# Patient Record
Sex: FEMALE | Race: WHITE
[De-identification: names, ages, dates, MRNs, and addresses within clinical notes are randomized per-mention and may not be internally consistent; named-entity substitution may affect disease eponyms.]

---

## 2017-01-29 ENCOUNTER — HOSPITAL ENCOUNTER (INPATIENT)
Dept: HOSPITAL 17 - NEPC | Age: 72
LOS: 3 days | Discharge: HOME | DRG: 310 | End: 2017-02-01
Payer: COMMERCIAL

## 2017-01-29 VITALS
SYSTOLIC BLOOD PRESSURE: 188 MMHG | HEART RATE: 140 BPM | TEMPERATURE: 98.5 F | OXYGEN SATURATION: 96 % | DIASTOLIC BLOOD PRESSURE: 125 MMHG | RESPIRATION RATE: 16 BRPM

## 2017-01-29 VITALS
RESPIRATION RATE: 18 BRPM | HEART RATE: 155 BPM | SYSTOLIC BLOOD PRESSURE: 121 MMHG | OXYGEN SATURATION: 97 % | DIASTOLIC BLOOD PRESSURE: 91 MMHG

## 2017-01-29 DIAGNOSIS — E78.5: ICD-10-CM

## 2017-01-29 DIAGNOSIS — I48.91: Primary | ICD-10-CM

## 2017-01-29 DIAGNOSIS — I47.1: ICD-10-CM

## 2017-01-29 DIAGNOSIS — E78.00: ICD-10-CM

## 2017-01-29 DIAGNOSIS — I48.92: ICD-10-CM

## 2017-01-29 DIAGNOSIS — F32.9: ICD-10-CM

## 2017-01-29 LAB
APTT BLD: 29.4 SEC (ref 24.3–30.1)
BASOPHILS # BLD AUTO: 0.1 TH/MM3 (ref 0–0.2)
BASOPHILS NFR BLD: 1.1 % (ref 0–2)
EOSINOPHIL # BLD: 0.4 TH/MM3 (ref 0–0.4)
EOSINOPHIL NFR BLD: 3.5 % (ref 0–4)
ERYTHROCYTE [DISTWIDTH] IN BLOOD BY AUTOMATED COUNT: 13.6 % (ref 11.6–17.2)
HCT VFR BLD CALC: 43 % (ref 35–46)
HEMO FLAGS: (no result)
INR PPP: 1 RATIO
LYMPHOCYTES # BLD AUTO: 2.3 TH/MM3 (ref 1–4.8)
LYMPHOCYTES NFR BLD AUTO: 22.8 % (ref 9–44)
MCH RBC QN AUTO: 29.8 PG (ref 27–34)
MCHC RBC AUTO-ENTMCNC: 34.3 % (ref 32–36)
MCV RBC AUTO: 86.9 FL (ref 80–100)
MONOCYTES NFR BLD: 9.6 % (ref 0–8)
NEUTROPHILS # BLD AUTO: 6.4 TH/MM3 (ref 1.8–7.7)
NEUTROPHILS NFR BLD AUTO: 63 % (ref 16–70)
PLATELET # BLD: 268 TH/MM3 (ref 150–450)
PROTHROMBIN TIME: 10.7 SEC (ref 9.8–11.6)
RBC # BLD AUTO: 4.95 MIL/MM3 (ref 4–5.3)
WBC # BLD AUTO: 10.2 TH/MM3 (ref 4–11)

## 2017-01-29 PROCEDURE — 96361 HYDRATE IV INFUSION ADD-ON: CPT

## 2017-01-29 PROCEDURE — 71010: CPT

## 2017-01-29 PROCEDURE — 85025 COMPLETE CBC W/AUTO DIFF WBC: CPT

## 2017-01-29 PROCEDURE — 85610 PROTHROMBIN TIME: CPT

## 2017-01-29 PROCEDURE — 80048 BASIC METABOLIC PNL TOTAL CA: CPT

## 2017-01-29 PROCEDURE — 93005 ELECTROCARDIOGRAM TRACING: CPT

## 2017-01-29 PROCEDURE — 83735 ASSAY OF MAGNESIUM: CPT

## 2017-01-29 PROCEDURE — 82550 ASSAY OF CK (CPK): CPT

## 2017-01-29 PROCEDURE — 85730 THROMBOPLASTIN TIME PARTIAL: CPT

## 2017-01-29 PROCEDURE — 84484 ASSAY OF TROPONIN QUANT: CPT

## 2017-01-29 PROCEDURE — 96365 THER/PROPH/DIAG IV INF INIT: CPT

## 2017-01-29 PROCEDURE — 96376 TX/PRO/DX INJ SAME DRUG ADON: CPT

## 2017-01-29 PROCEDURE — 84443 ASSAY THYROID STIM HORMONE: CPT

## 2017-01-29 RX ADMIN — PHENYTOIN SODIUM SCH MLS/HR: 50 INJECTION INTRAMUSCULAR; INTRAVENOUS at 23:18

## 2017-01-29 NOTE — RADRPT
EXAM DATE/TIME:  01/29/2017 23:14 

 

[facility] COMPARISON:     

CHEST SINGLE AP, July 24, 2015, 1:37.

 

                     

INDICATIONS :     

Tachycardia.

                     

 

MEDICAL HISTORY :     

None.          

 

SURGICAL HISTORY :     

None.   

 

ENCOUNTER:     

Initial                                        

 

ACUITY:     

1 day      

 

PAIN SCORE:     

0/10

 

LOCATION:     

Bilateral chest 

 

FINDINGS:     

A single view of the chest demonstrates the lungs to be symmetrically aerated without evidence of mas
s, infiltrate or effusion.  The cardiomediastinal contours are unremarkable.  Osseous structures are 
intact.

 

CONCLUSION:     

No evidence of acute cardiopulmonary disease.

 

 

 

 Nitin Luis MD on January 29, 2017 at 23:31           

Board Certified Radiologist.

 This report was verified electronically.

## 2017-01-29 NOTE — PD
HPI


Chief Complaint:  Chest Pain


Time Seen by Provider:  22:54


Travel History


International Travel<30 days:  No


Contact w/Intl Traveler<30days:  No


Traveled to known affect area:  No





History of Present Illness


HPI


71-year-old female complains of palpitation.  Patient has history of atrial 

fibrillation status post ablation procedure 3.  Patient is on Eliquis and 

Tikosyn.  Patient started having palpitation since 8:30 this evening.  Patient 

states that he started having tachycardia and irregular heartbeat since then.  

Patient denies any chest pain or shortness of breath.  Patient took Eliquis 

this evening but forgot to take one this morning.  Patient denies any headache.

  Patient denies abdominal pain.  Patient denies any fever chills coughing 

congestion.





PFSH


Past Medical History


Arthritis:  Yes (RIGHT SHOULDER)


Atrial Fibrillation:  Yes (RECENT MED CHANGE)


Blood Disorders:  No


Anxiety:  No


Depression:  Yes


Heart Rhythm Problems:  Yes


Cancer:  Yes (SKIN CA)


Cardiovascular Problems:  Yes (AFIB/ABLATIONS)


High Cholesterol:  Yes


Chest Pain:  No


Congestive Heart Failure:  No


Diabetes:  No


Diminished Hearing:  No


Endocrine:  No


Gastrointestinal Disorders:  No


Genitourinary:  No


Hypertension:  No


Immune Disorder:  No


Implanted Vascular Access Dvce:  No


Musculoskeletal:  Yes


Neurologic:  No


Psychiatric:  Yes


Reproductive:  No


Respiratory:  No


Immunizations Current:  Yes (SHINGLES 2009)


Myocardial Infarction:  No


Sickle Cell Disease:  No


Thyroid Disease:  No





Past Surgical History


Abdominal Surgery:  Yes (Aretha)


Cardiac Surgery:  Yes (ablation)


Cholecystectomy:  Yes


Gynecologic Surgery:  Yes (Hysterectomy)


Hysterectomy:  Yes


Oral Surgery:  Yes (TONSILLECTOMY)


Pacemaker:  No


Tonsillectomy:  Yes


Other Surgery:  Yes





Social History


Alcohol Use:  Yes (RARE)


Tobacco Use:  No


Substance Use:  No





Allergies-Medications


(Allergen,Severity, Reaction):  


Coded Allergies:  


     Keflex (Verified  Allergy, Intermediate, Hives, 7/24/15)


     Penicillin (Verified  Allergy, Intermediate, Hives, 7/24/15)


     Sulfa (Verified  Allergy, Intermediate, Hives, 7/24/15)


Reported Meds & Prescriptions





Reported Meds & Active Scripts


Active


Reported


Lovastatin 40 Mg Tab 40 Mg PO DAILY


Tikosyn (Dofetilide) 500 Mcg Cap 500 Mcg PO BID


     For Creatinine Clearance >60 mL/min


Eliquis (Apixaban) 5 Mg Tab 5 Mg PO BID








Review of Systems


General / Constitutional:  No: Fever


Eyes:  No: Visual changes


HENT:  No: Headaches


Cardiovascular:  Positive: Palpitations,  No: Chest Pain or Discomfort


Respiratory:  No: Shortness of Breath


Gastrointestinal:  No: Abdominal Pain


Genitourinary:  No: Dysuria


Musculoskeletal:  No: Pain


Skin:  No Rash


Neurologic:  No: Weakness


Psychiatric:  No: Depression


Endocrine:  No: Polydipsia


Hematologic/Lymphatic:  No: Easy Bruising





Physical Exam


Narrative


GENERAL: Well-nourished, well-developed patient.


SKIN: Warm and dry.


HEAD: Normocephalic.


EYES: No scleral icterus. No injection or drainage. 


NECK: Supple, trachea midline. No JVD or lymphadenopathy.


CARDIOVASCULAR: Irregular irregular rate and rhythm without murmurs, gallops, 

or rubs. 


RESPIRATORY: Breath sounds equal bilaterally. No accessory muscle use.


GASTROINTESTINAL: Abdomen soft, non-tender, nondistended. 


MUSCULOSKELETAL: No cyanosis, or edema. 


BACK: Nontender without obvious deformity. No CVA tenderness. 


Neurologic exam normal.





Data


Data


Last Documented VS





Vital Signs








  Date Time  Temp Pulse Resp B/P Pulse Ox O2 Delivery O2 Flow Rate FiO2


 


1/30/17 00:42   18  99   


 


1/30/17 00:40  136  152/88    


 


1/29/17 22:45 98.5       








Orders





 Sodium Chlor 0.9% 1000 Ml Inj (Ns 1000 M (1/29/17 23:15)


Vital Signs (Adult) Q15MX4,Q4H (1/29/17 23:01)


Cardiac Monitor / Telemetry (1/29/17 23:01)


Cardiac Rhythm ELAINE.Q8H (1/29/17 23:01)


^ Notify Dr: Other (1/29/17 23:01)


Diltiazem Inj (Cardizem Inj) (1/29/17 23:15)


Complete Blood Count With Diff (1/29/17 23:03)


Basic Metabolic Panel (Bmp) (1/29/17 23:03)


Creatine Kinase (Cpk) (1/29/17 23:03)


Troponin I (1/29/17 23:03)


Prothrombin Time / Inr (Pt) (1/29/17 23:03)


Act Partial Throm Time (Ptt) (1/29/17 23:03)


Thyroid Stimulating Hormone (1/29/17 23:03)


Chest, Single Ap (1/29/17 23:03)


Iv Access Insert/Monitor (1/29/17 23:03)


Ecg Monitoring (1/29/17 23:03)


Oximetry (1/29/17 23:03)


Vital Signs (Adult) Q15MX4,Q4H (1/29/17 23:47)


Cardiac Monitor / Telemetry (1/29/17 23:47)


Cardiac Rhythm ELAINE.Q8H (1/29/17 23:47)


^ Notify Dr: Other (1/29/17 23:47)


Diltiazem Inj (Cardizem Inj) (1/30/17 00:00)





Labs





 Laboratory Tests








Test 1/29/17





 23:15


 


White Blood Count 10.2 TH/MM3


 


Red Blood Count 4.95 MIL/MM3


 


Hemoglobin 14.7 GM/DL


 


Hematocrit 43.0 %


 


Mean Corpuscular Volume 86.9 FL


 


Mean Corpuscular Hemoglobin 29.8 PG


 


Mean Corpuscular Hemoglobin 34.3 %





Concent 


 


Red Cell Distribution Width 13.6 %


 


Platelet Count 268 TH/MM3


 


Mean Platelet Volume 9.4 FL


 


Neutrophils (%) (Auto) 63.0 %


 


Lymphocytes (%) (Auto) 22.8 %


 


Monocytes (%) (Auto) 9.6 %


 


Eosinophils (%) (Auto) 3.5 %


 


Basophils (%) (Auto) 1.1 %


 


Neutrophils # (Auto) 6.4 TH/MM3


 


Lymphocytes # (Auto) 2.3 TH/MM3


 


Monocytes # (Auto) 1.0 TH/MM3


 


Eosinophils # (Auto) 0.4 TH/MM3


 


Basophils # (Auto) 0.1 TH/MM3


 


CBC Comment DIFF FINAL 


 


Differential Comment  


 


Prothrombin Time 10.7 SEC


 


Prothromb Time International 1.0 RATIO





Ratio 


 


Activated Partial 29.4 SEC





Thromboplast Time 











[facility]


Medical Decision Making


Medical Screen Exam Complete:  Yes


Emergency Medical Condition:  Yes


Interpretation(s)


23:48 PM.  EKG shows atrial fibrillation with RVR.  Rate 151.  Chest x-ray 

shows no acute consolidation.  CBC within normal limit.


12:47 AM.  CBC within normal limit.  BUN 21.  Creatinine 1.01.  GFR 54.  

Cardiac enzymes are normal.  TSH 7.15.


Differential Diagnosis


Differential diagnosis including atrial fibrillation with RVR, PACs, PVCs, 

arrhythmia.


Narrative Course


71-year-old female with A. fib and RVR.  History of A. fib status post 

ablation.  Normal saline solution 1 25 cc an hour.  Cardizem 20 mg IV given.  

Cardizem drip started





Diagnosis





 Primary Impression:  


 Atrial fibrillation with RVR








Matthew Del Cid MD Jan 29, 2017 23:08

## 2017-01-30 VITALS
HEART RATE: 90 BPM | SYSTOLIC BLOOD PRESSURE: 129 MMHG | RESPIRATION RATE: 18 BRPM | DIASTOLIC BLOOD PRESSURE: 60 MMHG | OXYGEN SATURATION: 100 %

## 2017-01-30 VITALS
HEART RATE: 136 BPM | DIASTOLIC BLOOD PRESSURE: 88 MMHG | SYSTOLIC BLOOD PRESSURE: 152 MMHG | OXYGEN SATURATION: 100 % | RESPIRATION RATE: 18 BRPM

## 2017-01-30 VITALS
HEART RATE: 80 BPM | SYSTOLIC BLOOD PRESSURE: 111 MMHG | OXYGEN SATURATION: 95 % | RESPIRATION RATE: 16 BRPM | TEMPERATURE: 98.7 F | DIASTOLIC BLOOD PRESSURE: 58 MMHG

## 2017-01-30 VITALS
OXYGEN SATURATION: 96 % | RESPIRATION RATE: 18 BRPM | SYSTOLIC BLOOD PRESSURE: 125 MMHG | DIASTOLIC BLOOD PRESSURE: 60 MMHG | HEART RATE: 66 BPM

## 2017-01-30 VITALS
OXYGEN SATURATION: 100 % | RESPIRATION RATE: 18 BRPM | HEART RATE: 90 BPM | SYSTOLIC BLOOD PRESSURE: 125 MMHG | DIASTOLIC BLOOD PRESSURE: 62 MMHG

## 2017-01-30 VITALS
HEART RATE: 62 BPM | RESPIRATION RATE: 18 BRPM | SYSTOLIC BLOOD PRESSURE: 147 MMHG | DIASTOLIC BLOOD PRESSURE: 67 MMHG | OXYGEN SATURATION: 94 %

## 2017-01-30 VITALS
OXYGEN SATURATION: 95 % | SYSTOLIC BLOOD PRESSURE: 124 MMHG | HEART RATE: 80 BPM | RESPIRATION RATE: 16 BRPM | DIASTOLIC BLOOD PRESSURE: 61 MMHG

## 2017-01-30 VITALS
OXYGEN SATURATION: 95 % | RESPIRATION RATE: 18 BRPM | SYSTOLIC BLOOD PRESSURE: 128 MMHG | DIASTOLIC BLOOD PRESSURE: 63 MMHG | HEART RATE: 85 BPM

## 2017-01-30 VITALS — HEART RATE: 50 BPM

## 2017-01-30 VITALS
OXYGEN SATURATION: 95 % | HEART RATE: 66 BPM | DIASTOLIC BLOOD PRESSURE: 78 MMHG | RESPIRATION RATE: 18 BRPM | SYSTOLIC BLOOD PRESSURE: 139 MMHG

## 2017-01-30 VITALS
HEART RATE: 122 BPM | SYSTOLIC BLOOD PRESSURE: 140 MMHG | OXYGEN SATURATION: 100 % | DIASTOLIC BLOOD PRESSURE: 81 MMHG | RESPIRATION RATE: 18 BRPM

## 2017-01-30 VITALS
SYSTOLIC BLOOD PRESSURE: 144 MMHG | OXYGEN SATURATION: 95 % | DIASTOLIC BLOOD PRESSURE: 83 MMHG | RESPIRATION RATE: 18 BRPM | HEART RATE: 85 BPM

## 2017-01-30 VITALS
OXYGEN SATURATION: 96 % | TEMPERATURE: 98.4 F | HEART RATE: 58 BPM | SYSTOLIC BLOOD PRESSURE: 137 MMHG | RESPIRATION RATE: 17 BRPM | DIASTOLIC BLOOD PRESSURE: 66 MMHG

## 2017-01-30 VITALS
RESPIRATION RATE: 16 BRPM | OXYGEN SATURATION: 99 % | SYSTOLIC BLOOD PRESSURE: 158 MMHG | DIASTOLIC BLOOD PRESSURE: 74 MMHG | HEART RATE: 125 BPM

## 2017-01-30 VITALS
OXYGEN SATURATION: 98 % | HEART RATE: 85 BPM | SYSTOLIC BLOOD PRESSURE: 118 MMHG | RESPIRATION RATE: 20 BRPM | DIASTOLIC BLOOD PRESSURE: 58 MMHG

## 2017-01-30 VITALS
RESPIRATION RATE: 18 BRPM | HEART RATE: 130 BPM | SYSTOLIC BLOOD PRESSURE: 142 MMHG | OXYGEN SATURATION: 99 % | DIASTOLIC BLOOD PRESSURE: 84 MMHG

## 2017-01-30 VITALS — OXYGEN SATURATION: 94 %

## 2017-01-30 VITALS
OXYGEN SATURATION: 95 % | HEART RATE: 59 BPM | SYSTOLIC BLOOD PRESSURE: 145 MMHG | DIASTOLIC BLOOD PRESSURE: 68 MMHG | RESPIRATION RATE: 20 BRPM

## 2017-01-30 VITALS
SYSTOLIC BLOOD PRESSURE: 130 MMHG | DIASTOLIC BLOOD PRESSURE: 63 MMHG | RESPIRATION RATE: 18 BRPM | OXYGEN SATURATION: 95 % | HEART RATE: 61 BPM

## 2017-01-30 VITALS
RESPIRATION RATE: 16 BRPM | OXYGEN SATURATION: 94 % | HEART RATE: 90 BPM | SYSTOLIC BLOOD PRESSURE: 134 MMHG | DIASTOLIC BLOOD PRESSURE: 63 MMHG

## 2017-01-30 VITALS — RESPIRATION RATE: 18 BRPM | OXYGEN SATURATION: 99 %

## 2017-01-30 VITALS — OXYGEN SATURATION: 95 %

## 2017-01-30 VITALS — HEART RATE: 67 BPM

## 2017-01-30 LAB
ANION GAP SERPL CALC-SCNC: 7 MEQ/L (ref 5–15)
BUN SERPL-MCNC: 21 MG/DL (ref 7–18)
CHLORIDE SERPL-SCNC: 106 MEQ/L (ref 98–107)
CK SERPL-CCNC: 134 U/L (ref 26–192)
GFR SERPLBLD BASED ON 1.73 SQ M-ARVRAT: 54 ML/MIN (ref 89–?)
HCO3 BLD-SCNC: 26.9 MEQ/L (ref 21–32)
POTASSIUM SERPL-SCNC: 4.4 MEQ/L (ref 3.5–5.1)
SODIUM SERPL-SCNC: 140 MEQ/L (ref 136–145)

## 2017-01-30 RX ADMIN — SODIUM CHLORIDE, PRESERVATIVE FREE SCH ML: 5 INJECTION INTRAVENOUS at 09:10

## 2017-01-30 RX ADMIN — METOPROLOL TARTRATE SCH MG: 25 TABLET, FILM COATED ORAL at 13:00

## 2017-01-30 RX ADMIN — PHENYTOIN SODIUM SCH MLS/HR: 50 INJECTION INTRAMUSCULAR; INTRAVENOUS at 01:20

## 2017-01-30 RX ADMIN — PRAVASTATIN SODIUM SCH MG: 40 TABLET ORAL at 22:04

## 2017-01-30 RX ADMIN — PRAVASTATIN SODIUM SCH MG: 40 TABLET ORAL at 14:18

## 2017-01-30 RX ADMIN — DOFETILIDE SCH MCG: 0.25 CAPSULE ORAL at 14:20

## 2017-01-30 RX ADMIN — METOPROLOL TARTRATE SCH MG: 25 TABLET, FILM COATED ORAL at 19:30

## 2017-01-30 RX ADMIN — METOPROLOL TARTRATE SCH MG: 25 TABLET, FILM COATED ORAL at 23:47

## 2017-01-30 RX ADMIN — PHENYTOIN SODIUM SCH MLS/HR: 50 INJECTION INTRAMUSCULAR; INTRAVENOUS at 23:15

## 2017-01-30 RX ADMIN — SODIUM CHLORIDE, PRESERVATIVE FREE SCH ML: 5 INJECTION INTRAVENOUS at 21:00

## 2017-01-30 RX ADMIN — PHENYTOIN SODIUM SCH MLS/HR: 50 INJECTION INTRAMUSCULAR; INTRAVENOUS at 19:07

## 2017-01-30 NOTE — EKG
Date Performed: 01/29/2017       Time Performed: 22:54:53

 

PTAGE:      71 years

 

EKG:      ATRIAL FIBRILLATION WITH RAPID VENTRICULAR RESPONSE ABNORMAL RHYTHM ECG

 

PREVIOUS TRACING       : 07/26/2015 09.53 Compared to previous tracing, atrial fibrillation has repla
sandie Sinus rhythm 

 

, heart rate has increased.

 

DOCTOR:   Juan Mock  Interpretating Date/Time  01/30/2017 08:30:45

## 2017-01-30 NOTE — MB
cc:

HODA MALAVE M.D.

****

 

 

DATE OF CONSULTATION

1/30/2017

 

Electrophysiology consult.

 

REASON FOR CONSULTATION

Atrial fibrillation with atrial tachycardia with rapid ventricular response.

 

INDICATION

Mrs. Carolina is a 71-year-old  female known by my service history of

atrial fibrillation, previous cardioversion.  The last one was in January 2015.

Subsequently the patient was put on Tikosyn.  The patient was doing well for

the past 3 years until yesterday night. She forgot to take her Tikosyn

yesterday morning.  Yesterday night began with tachyarrhythmia.  She decided to

come to the emergency room.  IV Cardizem was initiated.  Heart rate controlled,

but still in atrial tachycardia.  I was consulted for further evaluation and

management.  The chart was reviewed.  The patient was evaluated.

 

ALLERGIES

KEFLEX, PENICILLIN AND SULFA.

 

SOCIAL HISTORY

Negative for smoking and drinking.

 

FAMILY HISTORY

Noncontributory to her current medical condition.

 

MEDICATIONS

1. The patient is on lovastatin.

2. Tikosyn.

3. Eliquis.

 

REVIEW OF SYSTEMS

Currently she refers no chest pain. No chest discomfort. Palpitations and

shortness of breath but no vomiting.  No fever.

PHYSICAL EXAMINATION

GENERAL: Alert, fully oriented, pleasant as usual.

VITAL SIGNS: Blood pressure 134/63, pulse 60, respiratory rate 18.

LUNGS: Ventilated.

CARDIOVASCULAR: S1-S2 regular.

ABDOMEN: Soft.  No mass.

EXTREMITIES: No edema.

 

Electrocardiogram atrial fibrillation with rapid ventricular response, possible

atrial tachycardia.

 

LABORATORY DATA

Hemoglobin 14.7, white blood cell 10.2.

 

Potassium 4.4, creatinine 1.01.

 

TSH 7.150.

 

INR 1.0.

 

ASSESSMENT AND RECOMMENDATIONS

Mrs. Carolina currently is stable.  Heart rate controlled.  Still in atrial

fibrillation, atrial tachycardia.  She was put on Cardizem.  That decreased her

heart rate.  She has no significant cardiac ______ condition.  At this point I

am going to resume the Tikosyn at 500 mcg a day.  I am going to DC the IV

Cardizem.  I will add metoprolol p.o. The patient will stay at least another

24-48 hours.  The case discussed with Dr. Mcrae. I will monitor her during

the hospitalization.

 

 

 

                              _________________________________

                              MD JANAE Chadwick/JAMIE

D:  1/30/2017/12:14 PM

T:  1/30/2017/5:17 PM

Visit #:  [id number]

Job #:  [id number]

## 2017-01-30 NOTE — HHI.HP
HPI


Service


[facility]


Primary Care Physician


Miky Deshpande MD


Admission Diagnosis


A. fib with RVR


Travel History


International Travel<30 Days:  No


Contact w/Intl Traveler <30 Da:  No


Traveled to Known Affected Are:  No


History of Present Illness





Pt is a 70 y/o F with h/o atrial fibrillation s/p ablation x 3





Pt admitted 1/5  1/8/15


Pt had tachyarrhythmia.


Pt underwent EPS with Dr. Faith 1/7/15 with ablation





Pt readmitted 6/29/15  7/2/15


Pt underwent cardioversion for electrical carioversion 7/1/15 by Dr Faith


Pt continued on Tikosyn 





Pt readmitted 7/24/15  7/26/15


Pt had developed atrial tachycardia with fast ventricular response.


Pts Tikosyn was increased from 250 mcg to 500 mcg





Pt readmitted to [facility] last night with c/o palpitations, tachycardia, 

and irregular heartbeat.  Pt denies SOB or chest pain. Pt taking eliquis, 

tikosyn, and eliquis.


Pt started on IV cardizem drip.  Pt is currently in NSR. Dr. Faith has been 

consulted.





Review of Systems


Constitutional:  DENIES: Diaphoretic episodes, Fatigue, Fever, Weight gain, 

Weight loss, Chills, Dizziness, Change in appetite, Night Sweats


Endocrine:  DENIES: Heat/cold intolerance, Polydipsia, Polyuria, Polyphagia


Eyes:  DENIES: Blurred vision, Diplopia, Eye inflammation, Eye pain, Vision loss

, Photosensitivity, Double Vision


Ears, nose, mouth, throat:  DENIES: Tinnitus, Hearing loss, Vertigo, Nasal 

discharge, Oral lesions, Throat pain, Hoarseness, Ear Pain, Running Nose, 

Epistaxis, Sinus Pain, Toothache, Odynophagia


Respiratory:  DENIES: Apneas, Cough, Snoring, Wheezing, Hemoptysis, Sputum 

production, Shortness of breath


Cardiovascular:  COMPLAINS OF: Palpitations,  DENIES: Chest pain, Syncope, 

Dyspnea on Exertion, PND, Lower Extremity Edema, Orthopnea, Claudication


Gastrointestinal:  DENIES: Abdominal pain, Black stools, Bloody stools, BRB per 

rectum, Constipation, Diarrhea, GERD, Nausea, Reflux, Vomiting, Difficulty 

Swallowing, Anorexia


Genitourinary:  DENIES: Urinary frequency, Urinary incontinence, Urgency, 

Hematuria, Dysuria, Nocturia


Musculoskeletal:  DENIES: Joint pain, Muscle aches, Stiffness, Joint Swelling, 

Back pain, Neck pain


Integumentary:  DENIES: Abnormal pigmentation, Pruritus, Rash, Nail changes, 

Breast masses, Breast skin changes, Nipple discharge


Hematologic/lymphatic:  DENIES: Bruising, Lymphadenopathy


Immunologic/allergic:  DENIES: Eczema, Urticaria


Neurologic:  DENIES: Abnormal gait, Headache, Localized weakness, Paresthesias, 

Seizures, Speech Problems, Tremor, Poor Balance


Psychiatric:  DENIES: Anxiety, Confusion, Mood changes, Depression, 

Hallucinations, Agitation, Suicidal Ideation, Homicidal Ideation, Delusions, 

History of Bipolar, History of Schizophrenia





Past Family Social History


Past Medical History





afib/flutter s/p 2 ablations


depression


hyperlipidemia


diverticulosis


polpys


hemorrhoids


colonoscopy 5/14 after gib


Past Surgical History


colonoscopy 5/14 after gib


hysterectomy


cholecystectomy


afib  ablation x 2..retained sheath s/p cvs removal.


multiple cardiac ablations for afib/tachyarrythmia


Reported Medications





Reported Meds & Active Scripts


Active


Reported


Lovastatin 40 Mg Tab 40 Mg PO DAILY


Tikosyn (Dofetilide) 500 Mcg Cap 500 Mcg PO BID


     For Creatinine Clearance >60 mL/min


Eliquis (Apixaban) 5 Mg Tab 5 Mg PO BID


Allergies:  


Coded Allergies:  


     Keflex (Verified  Allergy, Intermediate, Hives, 7/24/15)


     Penicillin (Verified  Allergy, Intermediate, Hives, 7/24/15)


     Sulfa (Verified  Allergy, Intermediate, Hives, 7/24/15)


Family History


non-contributory


Social History





- No tobacco


- No alcohol


- No illicit street drugs





Physical Exam


Vital Signs





 Vital Signs








  Date Time  Temp Pulse Resp B/P Pulse Ox O2 Delivery O2 Flow Rate FiO2


 


1/30/17 10:00  85 20 118/58 98 Room Air  


 


1/30/17 09:00  80 16 124/61 95   21


 


1/30/17 08:00  80 16 111/58 95   21


 


1/30/17 06:10  90 18 125/62 100   


 


1/30/17 05:09     94   21


 


1/30/17 04:29  90 18 129/60 100   


 


1/30/17 04:10  122 18 140/81 100   


 


1/30/17 02:30  125 16 158/74 99   


 


1/30/17 01:23  130 18 142/84 99   


 


1/30/17 00:42   18  99   


 


1/30/17 00:40  136 18 152/88 100   


 


1/29/17 23:02  155 18  97   


 


1/29/17 23:00  155 18 121/91 97   


 


1/29/17 22:45 98.5 140 16 188/125 96   








Physical Exam


GENERAL: This is a well-nourished, well-developed patient, in no apparent 

distress.


SKIN: No rashes, ecchymoses or lesions. Cool and dry.


HEAD: Atraumatic. Normocephalic. No temporal or scalp tenderness.


EYES: Pupils equal round and reactive. Extraocular motions intact. No scleral 

icterus. No injection or drainage. 


ENT: Nose without bleeding, purulent drainage or septal hematoma. Throat 

without erythema, tonsillar hypertrophy or exudate. Uvula midline. Airway 

patent.


NECK: Trachea midline. No JVD or lymphadenopathy. Supple, nontender, no 

meningeal signs.


CARDIOVASCULAR: Regular rate and rhythm without murmurs, gallops, or rubs. 


RESPIRATORY: Clear to auscultation. Breath sounds equal bilaterally. No wheezes

, rales, or rhonchi.  


GASTROINTESTINAL: Abdomen soft, non-tender, nondistended. No hepato-splenomegaly

, or palpable masses. No guarding.


MUSCULOSKELETAL: Extremities without clubbing, cyanosis, or edema. No joint 

tenderness, effusion, or edema noted. No calf tenderness. Negative Homans sign 

bilaterally.


NEUROLOGICAL: Awake and alert. Cranial nerves II through XII intact.  Motor and 

sensory grossly within normal limits. Five out of 5 muscle strength in all 

muscle groups.  Normal speech.


Laboratory





Laboratory Tests








Test 1/29/17 1/30/17





 23:15 00:03


 


White Blood Count 10.2  


 


Red Blood Count 4.95  


 


Hemoglobin 14.7  


 


Hematocrit 43.0  


 


Mean Corpuscular Volume 86.9  


 


Mean Corpuscular Hemoglobin 29.8  


 


Mean Corpuscular Hemoglobin 34.3  





Concent  


 


Red Cell Distribution Width 13.6  


 


Platelet Count 268  


 


Mean Platelet Volume 9.4  


 


Neutrophils (%) (Auto) 63.0  


 


Lymphocytes (%) (Auto) 22.8  


 


Monocytes (%) (Auto) 9.6  


 


Eosinophils (%) (Auto) 3.5  


 


Basophils (%) (Auto) 1.1  


 


Neutrophils # (Auto) 6.4  


 


Lymphocytes # (Auto) 2.3  


 


Monocytes # (Auto) 1.0  


 


Eosinophils # (Auto) 0.4  


 


Basophils # (Auto) 0.1  


 


CBC Comment DIFF FINAL  


 


Differential Comment   


 


Prothrombin Time 10.7  


 


Prothromb Time International 1.0  





Ratio  


 


Activated Partial 29.4  





Thromboplast Time  


 


Sodium Level  140 


 


Potassium Level  4.4 


 


Chloride Level  106 


 


Carbon Dioxide Level  26.9 


 


Anion Gap  7 


 


Blood Urea Nitrogen  21 


 


Creatinine  1.01 


 


Estimat Glomerular Filtration  54 





Rate  


 


Random Glucose  119 


 


Calcium Level  8.7 


 


Total Creatine Kinase  134 


 


Troponin I  LESS THAN 0.02 


 


Thyroid Stimulating Hormone  7.150 





3rd Gen  








Result Diagram:  


1/29/17 2315                                                                   

             1/30/17 0003





Imaging





Last Impressions








Chest X-Ray 1/29/17 2303 Signed





Impressions: 





 Service Date/Time:  Sunday, January 29, 2017 23:14 - CONCLUSION:  No evidence 

of 





 acute cardiopulmonary disease.     Nitin Luis MD 











Septic Shock Reassessment


Heart:  Regular rate and rhythm


Lungs:  Clear


Skin:  Warm


Peripheral Pulses:     Bounding Right Radial


         Bounding Left Radial


         Bounding Right Popliteal


         Bounding Left Popliteal


         Bounding Right Dorsalis Pedis


         Bounding Left Dorsalis Pedis


         Bounding Right Posterior Tibial


         Bounding Left Posterior Tibial


Capillary Refill:  Brisk





Assessment and Plan


Problem List:  


(1) Atrial fibrillation with RVR


Status:  Acute


Plan:  


- pt has had multiple hospitalization for Afib/tachyarrhythmia, see HPI


- Pt has already had 3 ablations


- IV cardizem


- monitor on telemetry


- await consultation from Dr. Faith


- resume tikosyn & [place]? will d/w Dr. Faith





(2) Hyperlipidemia


Status:  Acute


Plan:  


- continue statin





(3) Depression


Status:  Chronic


[facility]  


[facility]








Physician Certification


2 Midnight Certification Type:  Admission for Inpatient Services


Order for Inpatient Services


The services are ordered in accordance with Medicare regulations or non-

Medicare payer requirements, as applicable.  In the case of services not 

specified as inpatient-only, they are appropriately provided as inpatient 

services in accordance with the 2-midnight benchmark.


Estimated LOS (days):  3


3 days is the estimated time the patient will need to remain in the hospital, 

assuming treatment plan goals are met and no additional complications.


Post-Hospital Plan:  Not yet determined





Problem Qualifiers





(1) Hyperlipidemia:  


Qualified Code:  E78.5 - Hyperlipidemia, unspecified hyperlipidemia type


(2) Depression:  


Qualified Code:  F32.9 - Depression, unspecified depression type





Ian Mcrae 30, 2017 11:15

## 2017-01-31 VITALS — HEART RATE: 65 BPM

## 2017-01-31 VITALS
RESPIRATION RATE: 17 BRPM | SYSTOLIC BLOOD PRESSURE: 137 MMHG | TEMPERATURE: 98.6 F | DIASTOLIC BLOOD PRESSURE: 66 MMHG | HEART RATE: 54 BPM | OXYGEN SATURATION: 97 %

## 2017-01-31 VITALS
OXYGEN SATURATION: 96 % | TEMPERATURE: 98.3 F | HEART RATE: 84 BPM | SYSTOLIC BLOOD PRESSURE: 152 MMHG | DIASTOLIC BLOOD PRESSURE: 76 MMHG | RESPIRATION RATE: 16 BRPM

## 2017-01-31 VITALS — HEART RATE: 57 BPM

## 2017-01-31 VITALS — HEART RATE: 60 BPM

## 2017-01-31 VITALS
TEMPERATURE: 98.5 F | OXYGEN SATURATION: 96 % | SYSTOLIC BLOOD PRESSURE: 153 MMHG | RESPIRATION RATE: 17 BRPM | HEART RATE: 82 BPM | DIASTOLIC BLOOD PRESSURE: 68 MMHG

## 2017-01-31 VITALS
OXYGEN SATURATION: 97 % | TEMPERATURE: 98.9 F | HEART RATE: 56 BPM | DIASTOLIC BLOOD PRESSURE: 58 MMHG | SYSTOLIC BLOOD PRESSURE: 131 MMHG | RESPIRATION RATE: 18 BRPM

## 2017-01-31 VITALS — HEART RATE: 56 BPM

## 2017-01-31 VITALS — OXYGEN SATURATION: 94 %

## 2017-01-31 VITALS
TEMPERATURE: 98.5 F | HEART RATE: 69 BPM | SYSTOLIC BLOOD PRESSURE: 145 MMHG | OXYGEN SATURATION: 98 % | DIASTOLIC BLOOD PRESSURE: 60 MMHG | RESPIRATION RATE: 17 BRPM

## 2017-01-31 VITALS — HEART RATE: 61 BPM

## 2017-01-31 VITALS — HEART RATE: 71 BPM

## 2017-01-31 VITALS — HEART RATE: 64 BPM

## 2017-01-31 VITALS — HEART RATE: 58 BPM

## 2017-01-31 VITALS — HEART RATE: 53 BPM

## 2017-01-31 VITALS — HEART RATE: 52 BPM

## 2017-01-31 VITALS — HEART RATE: 55 BPM

## 2017-01-31 RX ADMIN — METOPROLOL TARTRATE SCH MG: 25 TABLET, FILM COATED ORAL at 11:56

## 2017-01-31 RX ADMIN — SODIUM CHLORIDE, PRESERVATIVE FREE SCH ML: 5 INJECTION INTRAVENOUS at 20:10

## 2017-01-31 RX ADMIN — DOFETILIDE SCH MCG: 0.25 CAPSULE ORAL at 02:44

## 2017-01-31 RX ADMIN — PHENYTOIN SODIUM SCH MLS/HR: 50 INJECTION INTRAMUSCULAR; INTRAVENOUS at 20:10

## 2017-01-31 RX ADMIN — APIXABAN SCH MG: 5 TABLET, FILM COATED ORAL at 20:10

## 2017-01-31 RX ADMIN — PRAVASTATIN SODIUM SCH MG: 40 TABLET ORAL at 20:10

## 2017-01-31 RX ADMIN — METOPROLOL TARTRATE SCH MG: 25 TABLET, FILM COATED ORAL at 06:12

## 2017-01-31 RX ADMIN — DOFETILIDE SCH MCG: 0.25 CAPSULE ORAL at 14:00

## 2017-01-31 RX ADMIN — PHENYTOIN SODIUM SCH MLS/HR: 50 INJECTION INTRAMUSCULAR; INTRAVENOUS at 06:13

## 2017-01-31 RX ADMIN — SODIUM CHLORIDE, PRESERVATIVE FREE SCH ML: 5 INJECTION INTRAVENOUS at 08:54

## 2017-01-31 RX ADMIN — PHENYTOIN SODIUM SCH MLS/HR: 50 INJECTION INTRAMUSCULAR; INTRAVENOUS at 15:15

## 2017-01-31 NOTE — EKG
Date Performed: 01/31/2017       Time Performed: 00:59:26

 

PTAGE:      71 years

 

EKG:      Sinus rhythm 

 

 with PAC(s) Right axis deviation Lateral infarct - age undetermined Compared to the previous tracing
,  atrial fibrillation has resolved Abnormal ECG

 

PREVIOUS TRACING       : 1/29/17@22:54

 

DOCTOR:   Shiloh Tabor  Interpretating Date/Time  01/31/2017 14:15:35

## 2017-01-31 NOTE — HHI.PR
Subjective


Remarks


No new complaints.





Objective


Vitals





 Vital Signs








  Date Time  Temp Pulse Resp B/P Pulse Ox O2 Delivery O2 Flow Rate FiO2


 


1/31/17 11:00 98.5 69 17 145/60 98   


 


1/31/17 10:54     94   21


 


1/31/17 10:00  61      


 


1/31/17 09:00  61      


 


1/31/17 08:00  58      


 


1/31/17 07:30 98.5 82 17 153/68 96   


 


1/31/17 07:00  60      


 


1/31/17 06:00  60      


 


1/31/17 05:00  53      


 


1/31/17 04:00  61      


 


1/31/17 03:00 98.3 84 16 152/76 96   


 


1/31/17 03:00  58      


 


1/31/17 02:00  52      


 


1/31/17 01:00  58      


 


1/31/17 00:00  55      


 


1/30/17 23:00 98.4 58 17 137/66 96   


 


1/30/17 23:00  58      


 


1/30/17 22:00  67      


 


1/30/17 21:00  50      


 


1/30/17 20:00     95   


 


1/30/17 19:26  61 18 130/63 95 Room Air  


 


1/30/17 19:14  59 20 145/68 95 Nasal Cannula  


 


1/30/17 17:00  66 18 139/78 95 Room Air  


 


1/30/17 15:00  62 18 147/67 94 Room Air  21


 


1/30/17 14:22  66 18 125/60 96 Room Air  


 


1/30/17 13:00  85 18 144/83 95 Room Air  21














 1/30/17 1/30/17 1/31/17





 15:00 23:00 07:00


 


Intake Total  320 ml 2132 ml


 


Balance  320 ml 2132 ml


 


   


 


Intake Oral  320 ml 480 ml


 


IV Total   1652 ml


 


# Voids   3








Result Diagram:  


1/29/17 2315                                                                   

             1/30/17 0003





Imaging





Last Impressions








Chest X-Ray 1/29/17 2303 Signed





Impressions: 





 Service Date/Time:  Sunday, January 29, 2017 23:14 - CONCLUSION:  No evidence 

of 





 acute cardiopulmonary disease.     Nitin Luis MD 








Objective Remarks


GENERAL: This is a well-nourished, well-developed patient, in no apparent 

distress.


CARDIOVASCULAR: Regular rate and rhythm without murmurs, gallops, or rubs. 


RESPIRATORY: Clear to auscultation. Breath sounds equal bilaterally. No wheezes

, rales, or rhonchi.  


GASTROINTESTINAL: Abdomen soft, non-tender, nondistended. Normal active bowel 

sounds


MUSCULOSKELETAL: Extremities without clubbing, cyanosis, or edema.


NEURO:  Alert & Oriented x4 to person, place, time, situation.  Moves all ext x4





A/P


Problem List:  


(1) Atrial fibrillation with RVR


Status:  Acute


Plan:  


- comgmt with Cardiology, Dr. Faith


- pt has had multiple hospitalization for Afib/tachyarrhythmia, see HPI


- Pt has already had 3 ablations





- tikosyn 500mg BID


- metoprolol 25mg q6h 


- case d/w Dr. Faith.  will change metoprolol to metoprolol xl 50mg BID


- pt states that in the past she had difficulty tolerating metoprolol xl d/t 

fatigue, will continue to observe in the hospital





telemetry reviewed: NSR 55-66 bpm





(2) Hyperlipidemia


Status:  Acute


Plan:  


- continue statin





(3) Depression


Status:  Chronic


Plan:  


- conitnue prozac








Problem Qualifiers





(1) Hyperlipidemia:  


Qualified Code:  E78.5 - Hyperlipidemia, unspecified hyperlipidemia type


(2) Depression:  


Qualified Code:  F32.9 - Depression, unspecified depression type





Ian Mcrae DO Jan 31, 2017 12:14

## 2017-02-01 VITALS
SYSTOLIC BLOOD PRESSURE: 167 MMHG | OXYGEN SATURATION: 98 % | DIASTOLIC BLOOD PRESSURE: 78 MMHG | TEMPERATURE: 98.2 F | HEART RATE: 52 BPM | RESPIRATION RATE: 16 BRPM

## 2017-02-01 VITALS
SYSTOLIC BLOOD PRESSURE: 132 MMHG | TEMPERATURE: 98.6 F | HEART RATE: 59 BPM | RESPIRATION RATE: 18 BRPM | OXYGEN SATURATION: 97 % | DIASTOLIC BLOOD PRESSURE: 67 MMHG

## 2017-02-01 VITALS — HEART RATE: 67 BPM

## 2017-02-01 VITALS
TEMPERATURE: 98.5 F | HEART RATE: 55 BPM | SYSTOLIC BLOOD PRESSURE: 143 MMHG | OXYGEN SATURATION: 96 % | DIASTOLIC BLOOD PRESSURE: 73 MMHG | RESPIRATION RATE: 18 BRPM

## 2017-02-01 VITALS — HEART RATE: 53 BPM

## 2017-02-01 VITALS
DIASTOLIC BLOOD PRESSURE: 88 MMHG | OXYGEN SATURATION: 95 % | TEMPERATURE: 98.1 F | SYSTOLIC BLOOD PRESSURE: 174 MMHG | HEART RATE: 72 BPM | RESPIRATION RATE: 18 BRPM

## 2017-02-01 VITALS — HEART RATE: 57 BPM

## 2017-02-01 VITALS — HEART RATE: 58 BPM

## 2017-02-01 VITALS — OXYGEN SATURATION: 98 %

## 2017-02-01 RX ADMIN — PHENYTOIN SODIUM SCH MLS/HR: 50 INJECTION INTRAMUSCULAR; INTRAVENOUS at 07:06

## 2017-02-01 RX ADMIN — APIXABAN SCH MG: 5 TABLET, FILM COATED ORAL at 08:56

## 2017-02-01 RX ADMIN — DOFETILIDE SCH MCG: 0.25 CAPSULE ORAL at 13:58

## 2017-02-01 RX ADMIN — SODIUM CHLORIDE, PRESERVATIVE FREE SCH ML: 5 INJECTION INTRAVENOUS at 08:56

## 2017-02-01 RX ADMIN — DOFETILIDE SCH MCG: 0.25 CAPSULE ORAL at 01:54

## 2017-02-01 RX ADMIN — PHENYTOIN SODIUM SCH MLS/HR: 50 INJECTION INTRAMUSCULAR; INTRAVENOUS at 13:59

## 2017-02-01 NOTE — EKG
Date Performed: 02/01/2017       Time Performed: 00:00:36

 

PTAGE:      71 years

 

EKG:      Sinus rhythm 

 

 Consider left atrial abnormality Compared to prior tracing no significant change Borderline ECG 

 

NO PREVIOUS TRACING            

 

DOCTOR:   Amie Leiva  Interpretating Date/Time  02/01/2017 15:47:38

## 2017-02-01 NOTE — HHI.DS
Discharge Summary


Admission Date


Jan 30, 2017 at 00:56


Discharge Date:  Feb 1, 2017


Admitting Diagnosis


LANI red with RVR





(1) Atrial fibrillation with RVR


Diagnosis:  Principal





(2) Hyperlipidemia


Diagnosis:  Principal





(3) Depression


Diagnosis:  Principal





Consultants


Dr. Lane Faith, Cardiology


Brief History





Pt is a 70 y/o F with h/o atrial fibrillation s/p ablation x 3





Pt admitted 1/5  1/8/15


Pt had tachyarrhythmia.


Pt underwent EPS with Dr. Faith 1/7/15 with ablation





Pt readmitted 6/29/15  7/2/15


Pt underwent cardioversion for electrical carioversion 7/1/15 by Dr Faith


Pt continued on Tikosyn 





Pt readmitted 7/24/15  7/26/15


Pt had developed atrial tachycardia with fast ventricular response.


Pts Tikosyn was increased from 250 mcg to 500 mcg





Pt readmitted to [facility] last night with c/o palpitations, tachycardia, 

and irregular heartbeat.  Pt denies SOB or chest pain. Pt taking eliquis, 

tikosyn, and eliquis.


Pt started on IV cardizem drip.  Pt is currently in NSR. Dr. Faith has been 

consulted.


CBC/BMP:  


1/29/17 2315                                                                   

             1/30/17 0003





Significant Findings





Laboratory Tests








Test 1/29/17 1/30/17





 23:15 00:03


 


Monocytes (%) (Auto) 9.6 % (0.0-8.0) 


 


Monocytes # (Auto) 1.0 TH/MM3 





 (0-0.9) 


 


Blood Urea Nitrogen  21 MG/DL (7-18)


 


Creatinine  1.01 MG/DL





  (0.50-1.00)


 


Estimat Glomerular Filtration  54 ML/MIN (>89)





Rate  


 


Random Glucose  119 MG/DL





  ()


 


Troponin I  LESS THAN 0.02





  NG/ML





  (0.02-0.05)


 


Thyroid Stimulating Hormone  7.150 uIU/ML





3rd Gen  (0.358-3.740)








Imaging





Last Impressions








Chest X-Ray 1/29/17 2303 Signed





Impressions: 





 Service Date/Time:  Sunday, January 29, 2017 23:14 - CONCLUSION:  No evidence 

of 





 acute cardiopulmonary disease.     Nitin Luis MD 








PE at Discharge


GENERAL: This is a well-nourished, well-developed patient, in no apparent 

distress.


CARDIOVASCULAR: Regular rate and rhythm without murmurs, gallops, or rubs. 


RESPIRATORY: Clear to auscultation. Breath sounds equal bilaterally. No wheezes

, rales, or rhonchi.  


GASTROINTESTINAL: Abdomen soft, non-tender, nondistended. Normal active bowel 

sounds


MUSCULOSKELETAL: Extremities without clubbing, cyanosis, or edema.


NEURO:  Alert & Oriented x4 to person, place, time, situation.  Moves all ext x4


Hospital Course





(1) Atrial fibrillation with RVR


Status:  Acute


Plan:  


- comgmt with Cardiology, Dr. Faith


- pt has had multiple hospitalization for Afib/tachyarrhythmia, see HPI


- Pt has already had 3 ablations





- tikosyn 500mg BID


- metoprolol 25mg q6h, converted to Metoprolol XL 50mg Daily.


- Pt previously had difficulties with severe fatigue with metoprolol xl 50mg 

daily, so trying daily dose instead of bib, but pt may require increasing to 

bid dose outpt


- telemetry reviewed: NSR 55-66 bpm


- discharge to home this evening (2/1/17)


- f/u with Dr. Faith in 1 week


- see discharge orders





(2) Hyperlipidemia


Status:  Acute


Plan:  


- continue statin





(3) Depression


Status:  Chronic


Plan:  


- conitnue prozac


Pt Condition on Discharge:  Stable


Discharge Disposition:  Discharge Home


Discharge Instructions


DIET: Follow Instructions for:  Heart Healthy Diet


Activities you can perform:  Regular-No Restrictions


Follow up Referrals:  


Cardiology - 1 Week with Indira Faith MD





New Medications:  


Metoprolol Succinate ER 24 HR (Metoprolol Succinate ER 24 HR) 50 Mg Tab


50 MG PO DAILY afib #30 Ref 0 TAB


 


Continued Medications:  


Apixaban (Eliquis) 5 Mg Tab


5 MG PO BID Blood Clot Prevention #60 Ref 0 TAB


Dofetilide (Tikosyn) 500 Mcg Cap


500 MCG PO BID For Creatinine Clearance >60 mL/min Regulate Heart Beat #30 Ref 

0 CAP


Fluoxetine (Fluoxetine) 20 Mg Cap


20 MG PO DAILY #30 Ref 0 CAP


Lovastatin (Lovastatin) 40 Mg Tab


40 MG PO DAILY Cholesterol Management #30 Ref 0 TAB











Ian Mcrae DO Feb 1, 2017 11:36

## 2017-02-01 NOTE — EKG
Date Performed: 01/31/2017       Time Performed: 11:51:38

 

PTAGE:      71 years

 

EKG:      Sinus bradycardia. Poor R wave progression - probable normal variant Compared to prior trac
ing no significant change Borderline ECG 

 

NO PREVIOUS TRACING            

 

DOCTOR:   Amie Leiva  Interpretating Date/Time  02/01/2017 15:52:17

## 2017-02-01 NOTE — HHI.DCPOC
Discharge Care Plan


Diagnosis:  


(1) Atrial fibrillation with RVR


(2) Depression


(3) Hyperlipidemia


Goals to Promote Your Health


* To prevent worsening of your condition and complications


* To maintain your health at the optimal level


Directions to Meet Your Goals


*** Take your medications as prescribed


*** Follow your dietary instruction


*** Follow activity as directed








*** Keep your appointments as scheduled


*** Take your immunizations and boosters as scheduled


*** If your symptoms worsen call your PCP, if no PCP go to Urgent Care Center 

or Emergency Room***


*** Smoking is Dangerous to Your Health. Avoid second hand smoke***


***Call the 24-hour hour crisis hotline for domestic abuse at 1-718.879.9618***








Ian Mcrae DO Feb 1, 2017 11:36

## 2017-02-03 NOTE — EKG
Date Performed: 02/01/2017       Time Performed: 12:47:54

 

PTAGE:      71 years

 

EKG:      Sinus bradycardia with sinus arrhythmia Normal ECG except for rate 

 

 PREVIOUS TRACING            : 02/01/2017 00.00

 

DOCTOR:   Dyllan Carlson  Interpretating Date/Time  02/03/2017 07:06:59

## 2017-12-01 ENCOUNTER — HOSPITAL ENCOUNTER (OUTPATIENT)
Dept: HOSPITAL 17 - HDOC | Age: 72
Discharge: HOME | End: 2017-12-01
Attending: INTERNAL MEDICINE
Payer: MEDICARE

## 2017-12-01 DIAGNOSIS — I05.9: ICD-10-CM

## 2017-12-01 DIAGNOSIS — R00.1: ICD-10-CM

## 2017-12-01 DIAGNOSIS — E66.9: ICD-10-CM

## 2017-12-01 DIAGNOSIS — K57.90: ICD-10-CM

## 2017-12-01 DIAGNOSIS — R94.31: ICD-10-CM

## 2017-12-01 DIAGNOSIS — I48.91: Primary | ICD-10-CM

## 2017-12-01 DIAGNOSIS — E78.5: ICD-10-CM

## 2017-12-01 PROCEDURE — 92960 CARDIOVERSION ELECTRIC EXT: CPT

## 2017-12-01 PROCEDURE — 93005 ELECTROCARDIOGRAM TRACING: CPT

## 2017-12-01 NOTE — MA
cc:

NADIA MARIN M.D., HANSCY M.D.

****

 

 

DATE:  12/1/2017

 

PROCEDURE

Cardioversion.

 

INDICATION

Mrs. Carolina is a 72-year-old  female with atrial fibrillation and

previous ablation on flecainide, symptomatic, to undergo cardioversion.  The

risks, the nature and the benefit of the procedure were clearly stated to her.

The risks include cardiac arrest, need for endotracheal intubation and even

death.  The patient understood and agreed to proceed.

 

DETAILS OF PROCEDURE

After written informed consent was obtained, the patient was evaluated by the

anesthesiologist.  Anterolateral pads were placed.  Subsequently 200

synchronized biphasic joules were delivered that converted the patient into

sinus rhythm.  No incident report.  The patient tolerated the procedure.

 

CONCLUSION

Successful cardioversion.

 

COMMENT/RECOMMENDATION

The patient is going to be observed and discharged home later today.

 

 

                              _________________________________

                               Indira Faith MD

 

 

 

HS/BT

D:  12/1/2017/3:56 PM

T:  12/1/2017/4:06 PM

Visit #:  [id number]

Job #:  [id number]

## 2017-12-03 NOTE — EKG
Date Performed: 12/01/2017       Time Performed: 13:37:20

 

PTAGE:      72 years

 

EKG:      Atrial fibrillation with rapid ventricular response IV conduction defect Lateral T wave josie
nges are nonspecific Abnormal ECG

 

PREVIOUS TRACING       : 02/01/2017 12.47 Compared to the previous tracing atrial fibrillation is new


 

DOCTOR:   Dyllan Carlson  Interpretating Date/Time  12/03/2017 23:28:04

## 2017-12-03 NOTE — EKG
Date Performed: 12/01/2017       Time Performed: 16:19:40

 

PTAGE:      72 years

 

EKG:      Sinus bradycardia with 1st degree A-V block. Possible anterior infarct - age undetermined L
ateral T wave changes may be due to myocardial ischemia Abnormal ECG

 

PREVIOUS TRACING       : 12/01/2017 13.37 Compared to the previous tracing atrial fibrillation is no 
longer present

 

DOCTOR:   Dyllan Carlson  Interpretating Date/Time  12/03/2017 23:21:55

## 2017-12-08 ENCOUNTER — HOSPITAL ENCOUNTER (EMERGENCY)
Dept: HOSPITAL 17 - NEPE | Age: 72
Discharge: HOME | End: 2017-12-08
Payer: MEDICARE

## 2017-12-08 VITALS — BODY MASS INDEX: 40.57 KG/M2 | HEIGHT: 62 IN | WEIGHT: 220.46 LBS

## 2017-12-08 VITALS
TEMPERATURE: 98 F | DIASTOLIC BLOOD PRESSURE: 69 MMHG | SYSTOLIC BLOOD PRESSURE: 178 MMHG | OXYGEN SATURATION: 96 % | HEART RATE: 64 BPM | RESPIRATION RATE: 24 BRPM

## 2017-12-08 DIAGNOSIS — I44.0: ICD-10-CM

## 2017-12-08 DIAGNOSIS — R06.02: Primary | ICD-10-CM

## 2017-12-08 DIAGNOSIS — R94.31: ICD-10-CM

## 2017-12-08 DIAGNOSIS — R00.1: ICD-10-CM

## 2017-12-08 DIAGNOSIS — I10: ICD-10-CM

## 2017-12-08 DIAGNOSIS — E78.5: ICD-10-CM

## 2017-12-08 DIAGNOSIS — I48.91: ICD-10-CM

## 2017-12-08 DIAGNOSIS — Z79.899: ICD-10-CM

## 2017-12-08 LAB
ANION GAP SERPL CALC-SCNC: 7 MEQ/L (ref 5–15)
BUN SERPL-MCNC: 16 MG/DL (ref 7–18)
CHLORIDE SERPL-SCNC: 103 MEQ/L (ref 98–107)
GFR SERPLBLD BASED ON 1.73 SQ M-ARVRAT: 54 ML/MIN (ref 89–?)
HCO3 BLD-SCNC: 27.9 MEQ/L (ref 21–32)
POTASSIUM SERPL-SCNC: 3.7 MEQ/L (ref 3.5–5.1)
SODIUM SERPL-SCNC: 138 MEQ/L (ref 136–145)

## 2017-12-08 PROCEDURE — 99285 EMERGENCY DEPT VISIT HI MDM: CPT

## 2017-12-08 PROCEDURE — 93005 ELECTROCARDIOGRAM TRACING: CPT

## 2017-12-08 PROCEDURE — 96374 THER/PROPH/DIAG INJ IV PUSH: CPT

## 2017-12-08 PROCEDURE — 80048 BASIC METABOLIC PNL TOTAL CA: CPT

## 2017-12-08 NOTE — PD
HPI


Chief Complaint:  Respiratory Symptoms


Time Seen by Provider:  12:00


Travel History


International Travel<30 days:  No


Contact w/Intl Traveler<30days:  No


Traveled to known affect area:  No





History of Present Illness


HPI


this 72 year-old woman who presents to the emergency department complaint 2 

weeks of worsening shortness of breath.  She is a history of A. fib, 

hyperlipidemia, hypertension.  She is on flecainide and Eliquis as well as some 

rate control medications.  She saw Dr. Enriquez, her cardiologist, and had a 

cardioversion done recently.  In the past week or so.  She said worsening 

symptoms and had workup including x-ray, CTA, ultrasound, labs all done in the 

past couple days.  They reportedly showed fluid on her right long.  She was 

sent here from the [facility] urgent care.  She is given a 

prescription for Lasix but states she can't take it because she is allergic to 

sulfa drugs.





History


Past Medical History


*** Narrative Medical


A. fib


Hyperlipidemia


Hypertension


Tetanus Vaccination:  < 5 Years


Influenza Vaccination:  Yes





Social History


Alcohol Use:  Yes (RARE)


Tobacco Use:  No





Allergies-Medications


(Allergen,Severity, Reaction):  


Coded Allergies:  


     Sulfa (Sulfonamide Antibiotics) (Verified  Allergy, Intermediate, Hives, 12 /8/17)


     cephalexin (Verified  Allergy, Intermediate, Hives, 12/8/17)


     penicillin G (Verified  Allergy, Intermediate, Hives, 12/8/17)


Reported Meds & Prescriptions





Reported Meds & Active Scripts


Active


Reported


Flecainide (Flecainide Acetate) 150 Mg Tab 150 Mg PO BID


Metoprolol Succinate ER 24 HR (Metoprolol Succinate) 50 Mg Tab 50 Mg PO BID


Fluoxetine (Fluoxetine HCl) 20 Mg Capsule 40 Mg PO DAILY


Lovastatin 40 Mg Tab 40 Mg PO HS


Eliquis (Apixaban) 5 Mg Tab 5 Mg PO BID








Review of Systems


Except as stated in HPI:  all other systems reviewed are Neg





Physical Exam


Narrative


GENERAL:Well appearing, no acute distress.  


SKIN: Focused skin assessment warm/dry.


HEAD: Atraumatic. Normocephalic. 


NECK: Trachea midline. No JVD. 


CARDIOVASCULAR: Regular rate and rhythm.  No murmur appreciated.


RESPIRATORY: No significant respiratory difficulties.  Coarse breath sounds in 

the face is.  No wheezing.


GASTROINTESTINAL: Abdomen soft, non-tender, nondistended. Hepatic and splenic 

margins not palpable. 


MUSCULOSKELETAL: No obvious deformities. Mod Edema.  


NEUROLOGICAL: Awake and alert. No obvious cranial nerve deficits.  Motor 

grossly within normal limits. Normal speech.


PSYCHIATRIC: Appropriate mood and affect; insight and judgment normal.





Data


Data


Last Documented VS





Vital Signs








  Date Time  Temp Pulse Resp B/P (MAP) Pulse Ox O2 Delivery O2 Flow Rate FiO2


 


12/8/17 12:08   16  96 Room Air  


 


12/8/17 11:52 98.0 64  178/69 (105)    








Orders





 Orders


Furosemide Inj (Lasix Inj) (12/8/17 12:30)


Basic Metabolic Panel (Bmp) (12/8/17 12:23)





Labs





Laboratory Tests








Test


  12/8/17


13:00


 


Blood Urea Nitrogen 16 MG/DL 


 


Creatinine 1.00 MG/DL 


 


Random Glucose 92 MG/DL 


 


Calcium Level 8.7 MG/DL 


 


Sodium Level 138 MEQ/L 


 


Potassium Level 3.7 MEQ/L 


 


Chloride Level 103 MEQ/L 


 


Carbon Dioxide Level 27.9 MEQ/L 


 


Anion Gap 7 MEQ/L 


 


Estimat Glomerular Filtration


Rate 54 ML/MIN 


 











MDM


Medical Decision Making


Medical Screen Exam Complete:  Yes


Emergency Medical Condition:  Yes


Interpretation(s)


My review of EKG: Sinus bradycardia rate of 59, OR interval so little bit long 

at 227, normal axis, normal intervals, no acute ischemia.





BMP: Unremarkable.


Differential Diagnosis


Volume overload, CHF, A. fib, other


Narrative Course


Medical decision-making 





72 old woman with a little bit of volume overload causing his shortness of 

breath.  She's had extensive workup including echo, x-ray, CTA was found a 

little bit of fluid on her right lung.  She is given a preferred diuretics but 

hasn't started because she is worried about the sulfa allergy and the Lasix.  I 

think she states to take the Lasix.  We'll give her a dose here, reassure her, 

have her start her outpatient medications, and follow-up will control.





Diagnosis





 Primary Impression:  


 SOB (shortness of breath)





***Additional Instructions:  


Take Lasix as prescribed.





Follow-up with her primary doctor on Monday.





Dr. Deshpande on Monday.





Return to the emergency department for any new or worsening symptoms.


Disposition:  01 DISCHARGE HOME


Condition:  Stable











Vinnie Castano MD Dec 8, 2017 12:23

## 2017-12-09 NOTE — EKG
Date Performed: 12/08/2017       Time Performed: 12:15:07

 

PTAGE:      72 years

 

EKG:      SINUS BRADYCARDIA WITH SINUS ARRHYTHMIA WITH FIRST DEGREE AV BLOCK MODERATE INTRAVENTRICULA
R CONDUCTION DELAY NONSPECIFIC ST & T-WAVE ABNORMALITY ABNORMAL ECG 

 

NO PREVIOUS TRACING            

 

DOCTOR:   Chuck Mejia  Interpretating Date/Time  12/09/2017 13:14:58

## 2018-03-05 ENCOUNTER — HOSPITAL ENCOUNTER (EMERGENCY)
Dept: HOSPITAL 17 - NEPC | Age: 73
Discharge: HOME | End: 2018-03-05
Payer: MEDICARE

## 2018-03-05 VITALS
OXYGEN SATURATION: 100 % | DIASTOLIC BLOOD PRESSURE: 87 MMHG | HEART RATE: 126 BPM | TEMPERATURE: 97.5 F | RESPIRATION RATE: 18 BRPM | SYSTOLIC BLOOD PRESSURE: 169 MMHG

## 2018-03-05 VITALS
OXYGEN SATURATION: 98 % | RESPIRATION RATE: 16 BRPM | HEART RATE: 74 BPM | DIASTOLIC BLOOD PRESSURE: 72 MMHG | SYSTOLIC BLOOD PRESSURE: 112 MMHG

## 2018-03-05 VITALS
DIASTOLIC BLOOD PRESSURE: 99 MMHG | OXYGEN SATURATION: 99 % | RESPIRATION RATE: 18 BRPM | HEART RATE: 113 BPM | SYSTOLIC BLOOD PRESSURE: 151 MMHG

## 2018-03-05 VITALS — HEIGHT: 62 IN | BODY MASS INDEX: 40.57 KG/M2 | WEIGHT: 220.46 LBS

## 2018-03-05 VITALS
OXYGEN SATURATION: 99 % | DIASTOLIC BLOOD PRESSURE: 73 MMHG | SYSTOLIC BLOOD PRESSURE: 102 MMHG | RESPIRATION RATE: 16 BRPM | HEART RATE: 80 BPM

## 2018-03-05 VITALS — HEART RATE: 117 BPM

## 2018-03-05 DIAGNOSIS — I48.0: Primary | ICD-10-CM

## 2018-03-05 DIAGNOSIS — Z88.0: ICD-10-CM

## 2018-03-05 DIAGNOSIS — I11.0: ICD-10-CM

## 2018-03-05 DIAGNOSIS — Z88.2: ICD-10-CM

## 2018-03-05 DIAGNOSIS — Z85.828: ICD-10-CM

## 2018-03-05 DIAGNOSIS — Z79.01: ICD-10-CM

## 2018-03-05 DIAGNOSIS — Z79.899: ICD-10-CM

## 2018-03-05 DIAGNOSIS — E78.00: ICD-10-CM

## 2018-03-05 DIAGNOSIS — F32.9: ICD-10-CM

## 2018-03-05 DIAGNOSIS — I50.9: ICD-10-CM

## 2018-03-05 DIAGNOSIS — E78.5: ICD-10-CM

## 2018-03-05 DIAGNOSIS — Z86.73: ICD-10-CM

## 2018-03-05 LAB
ALBUMIN SERPL-MCNC: 3.3 GM/DL (ref 3.4–5)
ALP SERPL-CCNC: 71 U/L (ref 45–117)
ALT SERPL-CCNC: 23 U/L (ref 10–53)
AST SERPL-CCNC: 21 U/L (ref 15–37)
BASOPHILS # BLD AUTO: 0.1 TH/MM3 (ref 0–0.2)
BASOPHILS NFR BLD: 0.8 % (ref 0–2)
BILIRUB SERPL-MCNC: 0.3 MG/DL (ref 0.2–1)
BUN SERPL-MCNC: 25 MG/DL (ref 7–18)
CALCIUM SERPL-MCNC: 9.1 MG/DL (ref 8.5–10.1)
CHLORIDE SERPL-SCNC: 100 MEQ/L (ref 98–107)
CREAT SERPL-MCNC: 1.45 MG/DL (ref 0.5–1)
EOSINOPHIL # BLD: 0.1 TH/MM3 (ref 0–0.4)
EOSINOPHIL NFR BLD: 1.2 % (ref 0–4)
ERYTHROCYTE [DISTWIDTH] IN BLOOD BY AUTOMATED COUNT: 15.2 % (ref 11.6–17.2)
GFR SERPLBLD BASED ON 1.73 SQ M-ARVRAT: 35 ML/MIN (ref 89–?)
GLUCOSE SERPL-MCNC: 86 MG/DL (ref 74–106)
HCO3 BLD-SCNC: 29.9 MEQ/L (ref 21–32)
HCT VFR BLD CALC: 44 % (ref 35–46)
HGB BLD-MCNC: 15.1 GM/DL (ref 11.6–15.3)
INR PPP: 1.1 RATIO
LYMPHOCYTES # BLD AUTO: 1.5 TH/MM3 (ref 1–4.8)
LYMPHOCYTES NFR BLD AUTO: 14.8 % (ref 9–44)
MCH RBC QN AUTO: 31.3 PG (ref 27–34)
MCHC RBC AUTO-ENTMCNC: 34.3 % (ref 32–36)
MCV RBC AUTO: 91.1 FL (ref 80–100)
MONOCYTE #: 0.9 TH/MM3 (ref 0–0.9)
MONOCYTES NFR BLD: 8.7 % (ref 0–8)
NEUTROPHILS # BLD AUTO: 7.5 TH/MM3 (ref 1.8–7.7)
NEUTROPHILS NFR BLD AUTO: 74.5 % (ref 16–70)
PLATELET # BLD: 289 TH/MM3 (ref 150–450)
PMV BLD AUTO: 8.6 FL (ref 7–11)
PROT SERPL-MCNC: 7.6 GM/DL (ref 6.4–8.2)
PROTHROMBIN TIME: 10.9 SEC (ref 9.8–11.6)
RBC # BLD AUTO: 4.83 MIL/MM3 (ref 4–5.3)
SODIUM SERPL-SCNC: 140 MEQ/L (ref 136–145)
TROPONIN I SERPL-MCNC: (no result) NG/ML (ref 0.02–0.05)
WBC # BLD AUTO: 10 TH/MM3 (ref 4–11)

## 2018-03-05 PROCEDURE — 80053 COMPREHEN METABOLIC PANEL: CPT

## 2018-03-05 PROCEDURE — 83880 ASSAY OF NATRIURETIC PEPTIDE: CPT

## 2018-03-05 PROCEDURE — 85730 THROMBOPLASTIN TIME PARTIAL: CPT

## 2018-03-05 PROCEDURE — 93005 ELECTROCARDIOGRAM TRACING: CPT

## 2018-03-05 PROCEDURE — 85610 PROTHROMBIN TIME: CPT

## 2018-03-05 PROCEDURE — 96374 THER/PROPH/DIAG INJ IV PUSH: CPT

## 2018-03-05 PROCEDURE — 82550 ASSAY OF CK (CPK): CPT

## 2018-03-05 PROCEDURE — 71046 X-RAY EXAM CHEST 2 VIEWS: CPT

## 2018-03-05 PROCEDURE — 85025 COMPLETE CBC W/AUTO DIFF WBC: CPT

## 2018-03-05 PROCEDURE — 84484 ASSAY OF TROPONIN QUANT: CPT

## 2018-03-05 NOTE — PD
Physical Exam


Narrative


I, Dr. Ray, have reviewed the advance practice practitioner's documentation and 

am in agreement, met with the patient face to face, made the diagnosis, and the 

medical decision making was done by me.  





*My assessment and Findings: Afib vs. dehydration vs. electrolyte abnormality





71yo F with history of afib on eliquis here with palpitations since Thursday.  

Said she saw her cardiologist Wednesday and everything was fine. Denies any 

chest pain or sob.  HR here was 110s to 120s.  Pt given cardizem 25mg IV.  Pt 

reevaluated at bedside and heart rate has been ranging from 80s to 90s.  Denies 

any palpitations.  Pt said she decreased her metoprolol from 50mg BID to 25mg 

BID but dont know why.  CXR negative.  Labs reviewed, no leukocytosis.  H/H 

normal.  BNP mildly elevated at 191.  BUN/creatinine mildly elevated at 25/

1.45.  This is mildly increased from baseline.  Troponin negative.  Discussed 

with Dr. Nolan who is covering Dr. Atkinson and he said she can follow up as 

outpatient and take metoprolol 50mg BID.  Pt has been observed and HR is now 

control with no symptoms.  Return precautions given.





Data


Data


Last Documented VS





Vital Signs








  Date Time  Temp Pulse Resp B/P (MAP) Pulse Ox O2 Delivery O2 Flow Rate FiO2


 


3/5/18 19:44        


 


3/5/18 18:00  74 16  98 Nasal Cannula 2.00 


 


3/5/18 16:20 97.5       








Orders





 Orders


Electrocardiogram (3/5/18 16:23)


B-Type Natriuretic Peptide (3/5/18 16:23)


Ckmb (Isoenzyme) Profile (3/5/18 16:23)


Complete Blood Count With Diff (3/5/18 16:23)


Comprehensive Metabolic Panel (3/5/18 16:23)


Prothrombin Time / Inr (Pt) (3/5/18 16:23)


Act Partial Throm Time (Ptt) (3/5/18 16:23)


Troponin I (3/5/18 16:23)


Chest, Pa & Lat (3/5/18 16:23)


Diltiazem Inj (Cardizem Inj) (3/5/18 17:45)


Ed Discharge Order (3/5/18 19:14)





Labs





Laboratory Tests








Test


  3/5/18


16:40


 


White Blood Count 10.0 TH/MM3 


 


Red Blood Count 4.83 MIL/MM3 


 


Hemoglobin 15.1 GM/DL 


 


Hematocrit 44.0 % 


 


Mean Corpuscular Volume 91.1 FL 


 


Mean Corpuscular Hemoglobin 31.3 PG 


 


Mean Corpuscular Hemoglobin


Concent 34.3 % 


 


 


Red Cell Distribution Width 15.2 % 


 


Platelet Count 289 TH/MM3 


 


Mean Platelet Volume 8.6 FL 


 


Neutrophils (%) (Auto) 74.5 % 


 


Lymphocytes (%) (Auto) 14.8 % 


 


Monocytes (%) (Auto) 8.7 % 


 


Eosinophils (%) (Auto) 1.2 % 


 


Basophils (%) (Auto) 0.8 % 


 


Neutrophils # (Auto) 7.5 TH/MM3 


 


Lymphocytes # (Auto) 1.5 TH/MM3 


 


Monocytes # (Auto) 0.9 TH/MM3 


 


Eosinophils # (Auto) 0.1 TH/MM3 


 


Basophils # (Auto) 0.1 TH/MM3 


 


CBC Comment DIFF FINAL 


 


Differential Comment  


 


Prothrombin Time 10.9 SEC 


 


Prothromb Time International


Ratio 1.1 RATIO 


 


 


Activated Partial


Thromboplast Time 25.7 SEC 


 


 


Blood Urea Nitrogen 25 MG/DL 


 


Creatinine 1.45 MG/DL 


 


Random Glucose 86 MG/DL 


 


Total Protein 7.6 GM/DL 


 


Albumin 3.3 GM/DL 


 


Calcium Level 9.1 MG/DL 


 


Alkaline Phosphatase 71 U/L 


 


Aspartate Amino Transf


(AST/SGOT) 21 U/L 


 


 


Alanine Aminotransferase


(ALT/SGPT) 23 U/L 


 


 


Total Bilirubin 0.3 MG/DL 


 


Sodium Level 140 MEQ/L 


 


Potassium Level 3.6 MEQ/L 


 


Chloride Level 100 MEQ/L 


 


Carbon Dioxide Level 29.9 MEQ/L 


 


Anion Gap 10 MEQ/L 


 


Estimat Glomerular Filtration


Rate 35 ML/MIN 


 


 


Total Creatine Kinase 48 U/L 


 


Troponin I


  LESS THAN 0.02


NG/ML


 


B-Type Natriuretic Peptide 191 PG/ML 











[facility]


Supervised Visit with SHARRI:  Yes


Interpretation(s)


EKG: Afib at 117bpm.  No ST segment elevation or depression.


Diagnosis





 Primary Impression:  


 Atrial fibrillation with RVR


Scripts


Metoprolol Succinate ER 24 HR (Metoprolol Succinate ER 24 HR) 50 Mg Tab


50 MG PO BID, #30 TAB 0 Refills


   Prov: Abigail Patton         3/5/18


Condition:  Stable











Tracy Ray DO Mar 5, 2018 18:29

## 2018-03-05 NOTE — PD
HPI


Chief Complaint:  Cardiac Complaint


Time Seen by Provider:  17:25


Travel History


International Travel<30 days:  No


Contact w/Intl Traveler<30days:  No


Traveled to known affect area:  No





History of Present Illness


HPI


72-year-old female with history of atrial fibrillation on Eliquis, congestive 

heart failure, hypertension, hyperlipidemia presents emergency department 

concerned about heart palpitations that started Thursday.  Patient states that 

she waited until today to come in because she thought that the heart 

palpitations are resolved.  Patient believes that she is in atrial 

fibrillation.  Currently, patient states that she feels mildly lightheaded and 

does have shortness of breath with exertion.  Denies pain.  Says that she saw 

her cardiologist, Dr. Rojas Wednesday who stated that she was doing well.  Her 

symptoms started Thursday however.  Says that she suspects that her medication 

change may be the cause of her atrial fibrillation today.  Says that she is 

normally on metoprolol 50 mg twice a day but her last prescription received 

last week was prescribed for 50 mg half tab twice a day.  She denies fevers or 

chills.  Denies any illnesses at this point.  No history of MI.  She does not 

remember the last time she had a cardiac ablation.





PFSH


Past Medical History


Hx Anticoagulant Therapy:  Yes (ELOQUIS)


Arthritis:  Yes (RIGHT SHOULDER)


Atrial Fibrillation:  Yes (RECENT MED CHANGE)


Blood Disorders:  No


Anxiety:  No


Depression:  Yes


Heart Rhythm Problems:  Yes


Cancer:  Yes (SKIN CA)


Cardiovascular Problems:  Yes (AFIB HTN)


High Cholesterol:  Yes


Chest Pain:  No


Congestive Heart Failure:  Yes


Cerebrovascular Accident:  Yes (TIA)


Diabetes:  No


Diminished Hearing:  No


Endocrine:  No


Gastrointestinal Disorders:  No


Genitourinary:  No


Hypertension:  Yes


Immune Disorder:  No


Implanted Vascular Access Dvce:  No


Musculoskeletal:  Yes


Neurologic:  No


Psychiatric:  Yes (depression)


Reproductive:  No


Respiratory:  No


Immunizations Current:  Yes (SHINGLES 2009)


Myocardial Infarction:  No


Sickle Cell Disease:  No


Thyroid Disease:  No





Past Surgical History


Abdominal Surgery:  Yes (Aretha)


Cardiac Surgery:  Yes (ablation)


Cholecystectomy:  Yes


Gynecologic Surgery:  Yes (Hysterectomy)


Hysterectomy:  Yes


Oral Surgery:  Yes (TONSILLECTOMY)


Pacemaker:  No


Tonsillectomy:  Yes


Other Surgery:  Yes





Social History


Alcohol Use:  Yes (RARE)


Tobacco Use:  No


Substance Use:  No





Allergies-Medications


(Allergen,Severity, Reaction):  


Coded Allergies:  


     Sulfa (Sulfonamide Antibiotics) (Verified  Allergy, Intermediate, Hives, 3/

5/18)


     cephalexin (Verified  Allergy, Intermediate, Hives, 3/5/18)


     penicillin G (Verified  Allergy, Intermediate, Hives, 3/5/18)


Reported Meds & Prescriptions





Reported Meds & Active Scripts


Active


Metoprolol Succinate ER 24 HR (Metoprolol Succinate) 50 Mg Tab 50 Mg PO BID


Lasix (Furosemide) 40 Mg Tab 40 Mg PO DAILY


Reported


Flecainide (Flecainide Acetate) 150 Mg Tab 150 Mg PO BID


Fluoxetine (Fluoxetine HCl) 20 Mg Capsule 40 Mg PO DAILY


Lovastatin 40 Mg Tab 40 Mg PO HS


Eliquis (Apixaban) 5 Mg Tab 5 Mg PO BID








Review of Systems


Except as stated in HPI:  all other systems reviewed are Neg





Physical Exam


Narrative


GENERAL: Well-developed, well-nourished in no apparent distress, resting 

comfortably but


SKIN: Focused skin assessment warm/dry.


HEAD: Atraumatic. Normocephalic. 


EYES: Pupils equal and round. No scleral icterus. No injection or drainage. 


ENT: No nasal bleeding or discharge.  Mucous membranes pink and moist.


NECK: Trachea midline. No JVD. 


CARDIOVASCULAR: Regular rate and rhythm.  No murmur appreciated.


RESPIRATORY: No accessory muscle use. Clear to auscultation. Breath sounds 

equal bilaterally. 


GASTROINTESTINAL: Abdomen soft, non-tender, nondistended. Hepatic and splenic 

margins not palpable. 


MUSCULOSKELETAL: No obvious deformities. No clubbing.  No cyanosis.  No edema.  

Homans sign negative bilaterally


NEUROLOGICAL: Awake and alert. No obvious cranial nerve deficits.  Motor 

grossly within normal limits. Normal speech.


PSYCHIATRIC: Appropriate mood and affect; insight and judgment normal.





Data


Data


Last Documented VS





Vital Signs








  Date Time  Temp Pulse Resp B/P (MAP) Pulse Ox O2 Delivery O2 Flow Rate FiO2


 


3/5/18 18:00  74 16 112/72 (85) 98 Nasal Cannula 2.00 


 


3/5/18 16:20 97.5       








Orders





 Orders


Electrocardiogram (3/5/18 16:23)


B-Type Natriuretic Peptide (3/5/18 16:23)


Ckmb (Isoenzyme) Profile (3/5/18 16:23)


Complete Blood Count With Diff (3/5/18 16:23)


Comprehensive Metabolic Panel (3/5/18 16:23)


Prothrombin Time / Inr (Pt) (3/5/18 16:23)


Act Partial Throm Time (Ptt) (3/5/18 16:23)


Troponin I (3/5/18 16:23)


Chest, Pa & Lat (3/5/18 16:23)


Diltiazem Inj (Cardizem Inj) (3/5/18 17:45)


Ed Discharge Order (3/5/18 19:14)





Labs





Laboratory Tests








Test


  3/5/18


16:40


 


White Blood Count 10.0 TH/MM3 


 


Red Blood Count 4.83 MIL/MM3 


 


Hemoglobin 15.1 GM/DL 


 


Hematocrit 44.0 % 


 


Mean Corpuscular Volume 91.1 FL 


 


Mean Corpuscular Hemoglobin 31.3 PG 


 


Mean Corpuscular Hemoglobin


Concent 34.3 % 


 


 


Red Cell Distribution Width 15.2 % 


 


Platelet Count 289 TH/MM3 


 


Mean Platelet Volume 8.6 FL 


 


Neutrophils (%) (Auto) 74.5 % 


 


Lymphocytes (%) (Auto) 14.8 % 


 


Monocytes (%) (Auto) 8.7 % 


 


Eosinophils (%) (Auto) 1.2 % 


 


Basophils (%) (Auto) 0.8 % 


 


Neutrophils # (Auto) 7.5 TH/MM3 


 


Lymphocytes # (Auto) 1.5 TH/MM3 


 


Monocytes # (Auto) 0.9 TH/MM3 


 


Eosinophils # (Auto) 0.1 TH/MM3 


 


Basophils # (Auto) 0.1 TH/MM3 


 


CBC Comment DIFF FINAL 


 


Differential Comment  


 


Prothrombin Time 10.9 SEC 


 


Prothromb Time International


Ratio 1.1 RATIO 


 


 


Activated Partial


Thromboplast Time 25.7 SEC 


 


 


Blood Urea Nitrogen 25 MG/DL 


 


Creatinine 1.45 MG/DL 


 


Random Glucose 86 MG/DL 


 


Total Protein 7.6 GM/DL 


 


Albumin 3.3 GM/DL 


 


Calcium Level 9.1 MG/DL 


 


Alkaline Phosphatase 71 U/L 


 


Aspartate Amino Transf


(AST/SGOT) 21 U/L 


 


 


Alanine Aminotransferase


(ALT/SGPT) 23 U/L 


 


 


Total Bilirubin 0.3 MG/DL 


 


Sodium Level 140 MEQ/L 


 


Potassium Level 3.6 MEQ/L 


 


Chloride Level 100 MEQ/L 


 


Carbon Dioxide Level 29.9 MEQ/L 


 


Anion Gap 10 MEQ/L 


 


Estimat Glomerular Filtration


Rate 35 ML/MIN 


 


 


Total Creatine Kinase 48 U/L 


 


Troponin I


  LESS THAN 0.02


NG/ML


 


B-Type Natriuretic Peptide 191 PG/ML 











MDM


Medical Decision Making


Medical Screen Exam Complete:  Yes


Emergency Medical Condition:  Yes


Differential Diagnosis


Atrial fibrillation with RVR, NSTEMI, angina, sinus tachycardia


Narrative Course


72-year-old female with a history of atrial fibrillation presents emergency 

department concerned about being elation.  States that she has had heart 

palpitations and some shortness of breath with exertion since Thursday.  She 

believes that this happened because her metoprolol dose changed last week.  She 

was unaware that this change was supposed to occur.


Vital signs are stable except for heart rate which is ranging from the low 100s-

130s.


Physical exam findings are unremarkable except for heart rate which is 

irregularly irregular and tachycardic.


EKG shows atrial fibrillation with RVR at a rate of 117.


Labs and imaging studies ordered.


Diltiazem 0.25 mg/kg administered with stable decreased in HR of 100-115 BPM. 

BP stable at 112/70.


Pt says her symptoms have resolved. 


Dr. Ray spoke with Dr. Mock, on call cardiology. Pt should be placed on 

Metoprolol 50mg BID and follow up with her cardiologist. 


Her vitals remained stable throughout visit. 


Pt will be discharged with metoprolol 50mg BID. Return to the ED for worsening 

or persistent symptoms.





Diagnosis





 Primary Impression:  


 Atrial fibrillation with RVR


Referrals:  


Cardiologist





Primary Care Physician





***Additional Instructions:  


Take all medications as prescribed.


Take metoprolol 50mg BID.


Follow up with your primary care physician within 2-3 days.


Scripts


Metoprolol Succinate ER 24 HR (Metoprolol Succinate ER 24 HR) 50 Mg Tab


50 MG PO BID, #30 TAB 0 Refills


   Prov: Abigail Patton         3/5/18


Disposition:  01 DISCHARGE HOME


Condition:  Stable











Abigail Patton Mar 5, 2018 17:41

## 2018-03-05 NOTE — RADRPT
EXAM DATE/TIME:  03/05/2018 17:12 

 

[facility] COMPARISON:     

No previous studies available for comparison.

 

                     

INDICATIONS :     

Chest pain

                     

 

MEDICAL HISTORY :            

A-fib   

 

SURGICAL HISTORY :     

None.   

 

ENCOUNTER:     

Initial                                        

 

ACUITY:     

1 day      

 

PAIN SCORE:     

2/10

 

LOCATION:      

chest 

 

FINDINGS:     

PA and lateral views of the chest demonstrate the lungs to be symmetrically aerated without evidence 
of mass, infiltrate or effusion.  The cardiomediastinal contours are unremarkable.  Osseous structure
s are intact.

 

CONCLUSION:     No acute disease.  

 

 

 

 Mal Jones Jr., MD on March 05, 2018 at 17:45           

Board Certified Radiologist.

 This report was verified electronically.

## 2018-03-07 NOTE — EKG
Date Performed: 2018       Time Performed: 16:49:47

 

PTAGE:      72 years

 

EKG:      ATRIAL FIBRILLATION WITH RAPID VENTRICULAR RESPONSE SLIGHT INTRAVENTRICULAR CONDUCTION GRAHAM
Y ABNORMAL RHYTHM ECG Compared to 

 

 PREVIOUS TRACING            , atrial fibrillation is new. PREVIOUS TRACIN2017 12.15

 

DOCTOR:   Mann Pleitez  Interpretating Date/Time  2018 07:02:54